# Patient Record
Sex: MALE | Race: WHITE | NOT HISPANIC OR LATINO | Employment: FULL TIME | URBAN - NONMETROPOLITAN AREA
[De-identification: names, ages, dates, MRNs, and addresses within clinical notes are randomized per-mention and may not be internally consistent; named-entity substitution may affect disease eponyms.]

---

## 2017-10-01 ENCOUNTER — APPOINTMENT (OUTPATIENT)
Dept: GENERAL RADIOLOGY | Facility: HOSPITAL | Age: 47
End: 2017-10-01

## 2017-10-01 ENCOUNTER — APPOINTMENT (OUTPATIENT)
Dept: ULTRASOUND IMAGING | Facility: HOSPITAL | Age: 47
End: 2017-10-01

## 2017-10-01 ENCOUNTER — HOSPITAL ENCOUNTER (EMERGENCY)
Facility: HOSPITAL | Age: 47
Discharge: HOME | End: 2017-10-01
Admitting: EMERGENCY MEDICINE

## 2017-10-01 VITALS
DIASTOLIC BLOOD PRESSURE: 79 MMHG | HEIGHT: 64 IN | OXYGEN SATURATION: 100 % | HEART RATE: 61 BPM | RESPIRATION RATE: 12 BRPM | TEMPERATURE: 98.2 F | WEIGHT: 164 LBS | BODY MASS INDEX: 28 KG/M2 | SYSTOLIC BLOOD PRESSURE: 121 MMHG

## 2017-10-01 DIAGNOSIS — M79.604 PAIN OF RIGHT LOWER EXTREMITY: Primary | ICD-10-CM

## 2017-10-01 PROCEDURE — 93971 EXTREMITY STUDY: CPT | Performed by: SURGERY

## 2017-10-01 PROCEDURE — 73610 X-RAY EXAM OF ANKLE: CPT

## 2017-10-01 PROCEDURE — 73590 X-RAY EXAM OF LOWER LEG: CPT

## 2017-10-01 PROCEDURE — 99283 EMERGENCY DEPT VISIT LOW MDM: CPT

## 2017-10-01 PROCEDURE — 93971 EXTREMITY STUDY: CPT

## 2017-10-01 RX ORDER — ATORVASTATIN CALCIUM 20 MG/1
20 TABLET, FILM COATED ORAL DAILY
COMMUNITY

## 2017-10-01 RX ORDER — NAPROXEN 500 MG/1
500 TABLET ORAL 2 TIMES DAILY PRN
Qty: 10 TABLET | Refills: 0 | Status: SHIPPED | OUTPATIENT
Start: 2017-10-01 | End: 2017-10-06

## 2017-10-01 NOTE — ED PROVIDER NOTES
Subjective   Patient is a 46 y.o. male presenting with lower extremity pain.   History provided by:  Patient   used: No    Lower Extremity Issue   Location:  Leg  Time since incident:  2 weeks  Injury: yes    Mechanism of injury comment:  Ladder fell on lower rt leg 2 weeks ago, abrasions and bruising noted  Leg location:  R lower leg  Pain details:     Quality:  Aching and dull    Radiates to:  Does not radiate    Severity:  Mild    Onset quality:  Sudden    Duration:  2 weeks    Timing:  Constant    Progression:  Unchanged  Chronicity:  New  Dislocation: no    Foreign body present:  No foreign bodies  Tetanus status:  Up to date  Prior injury to area:  No  Relieved by:  Nothing  Worsened by:  Nothing  Ineffective treatments:  None tried  Associated symptoms: swelling    Associated symptoms: no back pain, no decreased ROM, no fatigue, no fever, no itching, no muscle weakness, no neck pain, no numbness, no stiffness and no tingling    Risk factors: no concern for non-accidental trauma, no frequent fractures, no known bone disorder, no obesity and no recent illness        Review of Systems   Constitutional: Negative for fatigue and fever.   Musculoskeletal: Negative for back pain, neck pain and stiffness.   Skin: Negative for itching.   All other systems reviewed and are negative.      Past Medical History:   Diagnosis Date   • Hyperlipidemia        No Known Allergies    Past Surgical History:   Procedure Laterality Date   • BICEPS TENDON REPAIR     • HAND SURGERY         History reviewed. No pertinent family history.    Social History     Social History   • Marital status:      Spouse name: N/A   • Number of children: N/A   • Years of education: N/A     Social History Main Topics   • Smoking status: Never Smoker   • Smokeless tobacco: None   • Alcohol use Yes      Comment: occ   • Drug use: No   • Sexual activity: Not Asked     Other Topics Concern   • None     Social History Narrative   •  None           Objective   Physical Exam   Constitutional: He is oriented to person, place, and time. He appears well-developed and well-nourished.   HENT:   Head: Normocephalic and atraumatic.   Eyes: Pupils are equal, round, and reactive to light.   Neck: Normal range of motion.   Cardiovascular: Normal rate.    Pulmonary/Chest: Effort normal.   Musculoskeletal: Normal range of motion.        Legs:  Neurological: He is alert and oriented to person, place, and time.   Skin: Skin is warm and dry.   Psychiatric: He has a normal mood and affect.   Nursing note and vitals reviewed.      Procedures         ED Course  ED Course   Comment By Time   The patient's x-rays are negative.  I did do a venous Doppler of the right lower extremity.  This shows no thrombosis noted.  There is no incidental finding of compartment syndrome.  At this time the patient will be discharged home in stable condition.  Advised to follow-up with his primary care provider once 2 days for recheck.  We will place an ACE wrap to help with the swelling.  At this time the patient will be discharged home in stable condition Renée Trejo, EDMUND 10/01 1721        XR Tibia Fibula 2 View Right   Final Result   No acute findings.   This report was finalized on 10/01/2017 15:41 by Dr. Allen Pham MD.      XR Ankle 3+ View Right   Final Result   No acute findings.   This report was finalized on 10/01/2017 15:40 by Dr. Allen Pham MD.      US Venous Doppler Lower Extremity Right (duplex)    (Results Pending)             MDM  Number of Diagnoses or Management Options  Pain of right lower extremity: new and requires workup     Amount and/or Complexity of Data Reviewed  Tests in the radiology section of CPT®: ordered and reviewed  Discuss the patient with other providers: yes    Patient Progress  Patient progress: stable      Final diagnoses:   Pain of right lower extremity            Renée Trejo, EDMUND  10/01/17 1722

## 2024-10-23 ENCOUNTER — OFFICE VISIT (OUTPATIENT)
Dept: INTERNAL MEDICINE | Facility: CLINIC | Age: 54
End: 2024-10-23
Payer: OTHER GOVERNMENT

## 2024-10-23 VITALS
HEIGHT: 64 IN | SYSTOLIC BLOOD PRESSURE: 144 MMHG | HEART RATE: 73 BPM | OXYGEN SATURATION: 96 % | WEIGHT: 171 LBS | DIASTOLIC BLOOD PRESSURE: 96 MMHG | TEMPERATURE: 98.6 F | BODY MASS INDEX: 29.19 KG/M2

## 2024-10-23 DIAGNOSIS — M50.30 DDD (DEGENERATIVE DISC DISEASE), CERVICAL: Primary | ICD-10-CM

## 2024-10-23 PROBLEM — M75.81 RIGHT ROTATOR CUFF TENDINITIS: Status: ACTIVE | Noted: 2024-10-23

## 2024-10-23 PROBLEM — M54.41 CHRONIC BILATERAL LOW BACK PAIN WITH BILATERAL SCIATICA: Status: ACTIVE | Noted: 2024-10-23

## 2024-10-23 PROBLEM — G89.29 CHRONIC BILATERAL LOW BACK PAIN WITH BILATERAL SCIATICA: Status: ACTIVE | Noted: 2024-10-23

## 2024-10-23 PROBLEM — M54.42 CHRONIC BILATERAL LOW BACK PAIN WITH BILATERAL SCIATICA: Status: ACTIVE | Noted: 2024-10-23

## 2024-10-23 PROBLEM — M51.362 DEGENERATION OF INTERVERTEBRAL DISC OF LUMBAR REGION WITH DISCOGENIC BACK PAIN AND LOWER EXTREMITY PAIN: Status: ACTIVE | Noted: 2024-10-23

## 2024-10-23 PROCEDURE — 99203 OFFICE O/P NEW LOW 30 MIN: CPT | Performed by: FAMILY MEDICINE

## 2024-10-23 NOTE — PROGRESS NOTES
Subjective     Chief Complaint   Patient presents with    Wellness Check     Would like to discuss referral        History of Present Illness    Patient's PMR from outside medical facility reviewed and noted.    Clement Muller is a 53 y.o. male who presents to establish a new Primary care physician.  [facility] states that he has recently had all of his blood work done at his annual physical and has been doing well.  Patient has a history of degenerative disc disease both in his cervical spine as well as his lumbar spine.  Reviewed patient's MRI of his lumbar spine I do believe this patient would benefit from the stem cell treatments that they are going to be doing at the clinic in [place].  He would like to get a referral back to follow-up with them for these treatments so we will go ahead and set that up for him today.  Patient otherwise reports no other new problems complaints or concerns    Past Medical History:   Past Medical History:   Diagnosis Date    Arthritis     Hearing loss     Hemorrhoid     Hyperlipidemia     Sleep apnea     Tinnitus      Past Surgical History:  Past Surgical History:   Procedure Laterality Date    BICEPS TENDON REPAIR      EYE SURGERY      PRK    HAND SURGERY       Social History:  reports that he has never smoked. He has never used smokeless tobacco. He reports current alcohol use of about 1.0 standard drink of alcohol per week. He reports that he does not use drugs.    Family History: family history includes Heart disease in his father; Stroke in his mother.      Allergies:  No Known Allergies  Medications:  Prior to Admission medications    Medication Sig Start Date End Date Taking? Authorizing Provider   atorvastatin (LIPITOR) 20 MG tablet Take 1 tablet by mouth Daily.   Yes Provider, MD Randy       DAMION: Over the last two weeks, how often have you been bothered by the following problems?  Feeling nervous, anxious  "or on edge: Not at all  Not being able to stop or control worrying: Not at all  Worrying too much about different things: Not at all  Trouble Relaxing: Not at all  Being so restless that it is hard to sit still: Not at all  Becoming easily annoyed or irritable: Not at all  Feeling afraid as if something awful might happen: Not at all  DAMION 7 Total Score: 0  If you checked any problems, how difficult have these problems made it for you to do your work, take care of things at home, or get along with other people: Not difficult at all      PHQ-9 Depression Screening  Little interest or pleasure in doing things? Not at all   Feeling down, depressed, or hopeless? Not at all   PHQ-2 Total Score 0   Trouble falling or staying asleep, or sleeping too much?     Feeling tired or having little energy?     Poor appetite or overeating?     Feeling bad about yourself - or that you are a failure or have let yourself or your family down?     Trouble concentrating on things, such as reading the newspaper or watching television?     Moving or speaking so slowly that other people could have noticed? Or the opposite - being so fidgety or restless that you have been moving around a lot more than usual?     Thoughts that you would be better off dead, or of hurting yourself in some way?     PHQ-9 Total Score     If you checked off any problems, how difficult have these problems made it for you to do your work, take care of things at home, or get along with other people?         PHQ-2 Total Score: 0   0 (Negative screening for depression)  Support given, observe for worsening symptoms    Review of systems   negative unless otherwise specified above in HPI    Objective     Vital Signs: /96 (BP Location: Right arm, Patient Position: Sitting, Cuff Size: Adult)   Pulse 73   Temp 98.6 °F (37 °C) (Infrared)   Ht 162.6 cm (64\")   Wt 77.6 kg (171 lb)   SpO2 96%   BMI 29.35 kg/m²     Physical Exam  Vitals and nursing note reviewed. " "  Constitutional:       General: He is not in acute distress.     Appearance: Normal appearance.   HENT:      Head: Normocephalic.   Eyes:      Extraocular Movements: Extraocular movements intact.      Pupils: Pupils are equal, round, and reactive to light.   Cardiovascular:      Rate and Rhythm: Normal rate and regular rhythm.      Heart sounds: Normal heart sounds. No murmur heard.  Pulmonary:      Effort: Pulmonary effort is normal. No respiratory distress.      Breath sounds: Normal breath sounds. No rhonchi or rales.   Abdominal:      General: Abdomen is flat. Bowel sounds are normal.      Palpations: Abdomen is soft.   Neurological:      General: No focal deficit present.      Mental Status: He is alert.         BMI is >= 25 and <30. (Overweight) The following options were offered after discussion;: weight loss educational material (shared in after visit summary)      Results Reviewed:  No results found for: \"GLUCOSE\", \"BUN\", \"CREATININE\", \"NA\", \"K\", \"CL\", \"CO2\", \"CALCIUM\", \"ALT\", \"AST\", \"WBC\", \"HCT\", \"PLT\", \"CHOL\", \"TRIG\", \"HDL\", \"LDL\", \"LDLHDL\", \"HGBA1C\"          Procedure   Procedures       Assessment / Plan     Assessment/Plan:   Diagnosis Plan   1. DDD (degenerative disc disease), cervical  Ambulatory Referral to Pain Management            Return in about 4 weeks (around 11/20/2024) for Recheck. unless patient needs to be seen sooner or acute issues arise.      I have discussed the patient results/orders and and plan/recommendation with them at today's visit.      Signed by:    Zbigniew Lopez MD Date: 10/23/24      "